# Patient Record
Sex: FEMALE
[De-identification: names, ages, dates, MRNs, and addresses within clinical notes are randomized per-mention and may not be internally consistent; named-entity substitution may affect disease eponyms.]

---

## 2018-07-20 ENCOUNTER — HOSPITAL ENCOUNTER (INPATIENT)
Dept: HOSPITAL 14 - H.L&D | Age: 28
LOS: 3 days | Discharge: HOME | DRG: 371 | End: 2018-07-23
Attending: OBSTETRICS & GYNECOLOGY | Admitting: OBSTETRICS & GYNECOLOGY
Payer: MEDICAID

## 2018-07-20 VITALS — BODY MASS INDEX: 34.8 KG/M2

## 2018-07-20 DIAGNOSIS — Z3A.39: ICD-10-CM

## 2018-07-20 DIAGNOSIS — N85.8: ICD-10-CM

## 2018-07-20 DIAGNOSIS — O34.211: Primary | ICD-10-CM

## 2018-07-20 LAB
BASOPHILS # BLD AUTO: 0 K/UL (ref 0–0.2)
BASOPHILS NFR BLD: 0.2 % (ref 0–2)
EOSINOPHIL # BLD AUTO: 0.1 K/UL (ref 0–0.7)
EOSINOPHIL NFR BLD: 1.2 % (ref 0–4)
ERYTHROCYTE [DISTWIDTH] IN BLOOD BY AUTOMATED COUNT: 14.4 % (ref 11.5–14.5)
HGB BLD-MCNC: 13 G/DL (ref 12–16)
LYMPHOCYTES # BLD AUTO: 2.4 K/UL (ref 1–4.3)
LYMPHOCYTES NFR BLD AUTO: 21 % (ref 20–40)
MCH RBC QN AUTO: 30 PG (ref 27–31)
MCHC RBC AUTO-ENTMCNC: 34.1 G/DL (ref 33–37)
MCV RBC AUTO: 87.9 FL (ref 81–99)
MONOCYTES # BLD: 0.7 K/UL (ref 0–0.8)
MONOCYTES NFR BLD: 6.2 % (ref 0–10)
NEUTROPHILS # BLD: 8.1 K/UL (ref 1.8–7)
NEUTROPHILS NFR BLD AUTO: 71.4 % (ref 50–75)
NRBC BLD AUTO-RTO: 0.1 % (ref 0–0)
PLATELET # BLD: 180 K/UL (ref 130–400)
PMV BLD AUTO: 10.3 FL (ref 7.2–11.7)
RBC # BLD AUTO: 4.34 MIL/UL (ref 3.8–5.2)
WBC # BLD AUTO: 11.4 K/UL (ref 4.8–10.8)

## 2018-07-20 PROCEDURE — 4A1HXCZ MONITORING OF PRODUCTS OF CONCEPTION, CARDIAC RATE, EXTERNAL APPROACH: ICD-10-PCS | Performed by: OBSTETRICS & GYNECOLOGY

## 2018-07-20 RX ADMIN — SIMETHICONE CHEW TAB 80 MG SCH MG: 80 TABLET ORAL at 22:52

## 2018-07-20 RX ADMIN — SIMETHICONE CHEW TAB 80 MG SCH: 80 TABLET ORAL at 16:38

## 2018-07-21 LAB
ERYTHROCYTE [DISTWIDTH] IN BLOOD BY AUTOMATED COUNT: 14.2 % (ref 11.5–14.5)
HGB BLD-MCNC: 11 G/DL (ref 12–16)
MCH RBC QN AUTO: 29.8 PG (ref 27–31)
MCHC RBC AUTO-ENTMCNC: 33.6 G/DL (ref 33–37)
MCV RBC AUTO: 88.7 FL (ref 81–99)
PLATELET # BLD: 145 K/UL (ref 130–400)
RBC # BLD AUTO: 3.68 MIL/UL (ref 3.8–5.2)
WBC # BLD AUTO: 13.8 K/UL (ref 4.8–10.8)

## 2018-07-21 RX ADMIN — OXYCODONE HYDROCHLORIDE AND ACETAMINOPHEN PRN TAB: 5; 325 TABLET ORAL at 08:18

## 2018-07-21 RX ADMIN — MULTIPLE VITAMINS W/ MINERALS TAB SCH TAB: TAB at 08:19

## 2018-07-21 RX ADMIN — SIMETHICONE CHEW TAB 80 MG SCH: 80 TABLET ORAL at 10:00

## 2018-07-21 RX ADMIN — SIMETHICONE CHEW TAB 80 MG SCH: 80 TABLET ORAL at 22:36

## 2018-07-21 RX ADMIN — SIMETHICONE CHEW TAB 80 MG SCH MG: 80 TABLET ORAL at 04:17

## 2018-07-21 RX ADMIN — SIMETHICONE CHEW TAB 80 MG SCH MG: 80 TABLET ORAL at 16:11

## 2018-07-22 RX ADMIN — OXYCODONE HYDROCHLORIDE AND ACETAMINOPHEN PRN TAB: 5; 325 TABLET ORAL at 15:01

## 2018-07-22 RX ADMIN — MULTIPLE VITAMINS W/ MINERALS TAB SCH TAB: TAB at 08:07

## 2018-07-22 RX ADMIN — SIMETHICONE CHEW TAB 80 MG SCH MG: 80 TABLET ORAL at 15:01

## 2018-07-22 RX ADMIN — SIMETHICONE CHEW TAB 80 MG SCH MG: 80 TABLET ORAL at 22:11

## 2018-07-22 RX ADMIN — SIMETHICONE CHEW TAB 80 MG SCH MG: 80 TABLET ORAL at 05:38

## 2018-07-22 RX ADMIN — SIMETHICONE CHEW TAB 80 MG SCH MG: 80 TABLET ORAL at 09:44

## 2018-07-22 NOTE — OBPPN
===========================

Datetime: 2018 07:00

===========================

   

PP Pain Prov:  Within normal limits

PP Nausea Prov:  Denies

PP Flatus Prov:  Yes

PP BM Prov:  Yes

PP Breasts Prov:  Not Done

PP Heart Prov:  Normal

PP Lungs Prov:  Normal

PP Abdomen/Uterus Prov:  Normal

PP Lochia Prov:  Normal

PP Vulva/Perineum Prov:  Not Done

PP CVA Tenderness Prov:  Not Done

PP Extremities Prov:  Normal

PP C/S Incision Prov:  Normal

PP Breastfeeding Progress Prov:  Normal

PP Impression Prov:  Normal postpartum progression

PP Plan Prov:  Continue present management

PP Progress Note Prov:   POD 2 

   S: 29 yo  s/p  on 2018. Pt. is seen and examined at bedside this AM. No over
night events. Pt reports mild abdominal pain, but well controlled with pain meds. D/c roger, dressing
 removed, incision site healing well, no exudate seen, dry and intact. No nausea, advised to advance 
diet as tolerated. Breast feeding without difficulty. Lochia is similar to menses volume. Bowel movem
ent and passing gas per rectum. Denies fever/chills, diarrhea, nausea/vomiting, chest pain, dyspnea, 
and dizziness. 

   O: 

   VS: stable 

   GEN: NAD 

   Cardio: S1S2, no murmurs 

   Lungs: clear breath sounds b/l, no wheezing 

   Abdomen: BS+, tenderness to palpation. Incision scar noted, well healing with no exudate seen, dry
 and intact. Uterus is firm and at the level of the umbilicus. 

   EXT: No edema, calves nontender 

   NEURO/PSYCH: AAOx3, no grossly focal deficits, preserved affect and mood. 

   Assessment/Plan: 29 yo  s/p  on 2018. Pt remains afebrile, tolerating pain w
ith medication, doing well on POD#2. OOB with caution 

   SCDs for DVT prophylaxis, encouraged ambulating 

   Percocet 5/325mg, and Motrin 600mg for pain. 

   Encourage breastfeeding and ambulating 

   f/u CBC post op, 11.0/32.7 

   Tdap given 

   Anticipated d/c to home, 2018. 

   Case dw OB attending 

   --- Ja Collier MD PGY-2 

      

   OB Hospitalist note: On rounds I saw and examiend this patient. Agree with note BRANNON HILL PP Procedures:  None

Vital Signs Provider PP:  Reviewed; Within Normal Limits

## 2018-07-23 VITALS
SYSTOLIC BLOOD PRESSURE: 133 MMHG | HEART RATE: 87 BPM | RESPIRATION RATE: 16 BRPM | DIASTOLIC BLOOD PRESSURE: 77 MMHG | TEMPERATURE: 97.7 F | OXYGEN SATURATION: 99 %

## 2018-07-23 RX ADMIN — SIMETHICONE CHEW TAB 80 MG SCH MG: 80 TABLET ORAL at 04:06

## 2018-07-23 RX ADMIN — MULTIPLE VITAMINS W/ MINERALS TAB SCH TAB: TAB at 09:10

## 2018-07-23 RX ADMIN — SIMETHICONE CHEW TAB 80 MG SCH MG: 80 TABLET ORAL at 09:10

## 2018-07-23 NOTE — OBDCSUM
===========================

Datetime: 2018 08:02

===========================

   

Discharged to, Provider:  Home

Follow up at, Provider:  [facility]

Disch Instr Activity:  May be up to bathroom; May be up for meals; May Shower

Disch Instr Diet:  Regular

Discharge Instructions, Provider:  Routine instructions given

Discharge Diagnosis, Provider:  Term Pregnancy Delivered

Follow up in weeks, Provider:  1week and 6 weeks

Disch Referrals:  None

Contraception discussed, Prov:  Yes

Disch Activity Restrictions:  No lifting; Minimize stair-climbing; Nothing in vagina - [place], sanchez vza

Discharge Comment, Provider:  1. Continue breastfeeding.

   2. Ibuprofen as needed for moderate pain, and percocet for severe pain. No driving or operating he
aleena machinery while on percocet because of drowsiness.

   3. Continue walking as much as tolerated.

   4. Please plan  follow up visit in 3-7 days with pediatrician.

   5. Please see your doctor in 6 weeks.

   6. No heavy lifting or anything in the vagina for 6 weeks. 

   7. Please avoid stairs if possible. 

   8. If you develop severe or worsening pain, please go to the ED.

      

Contraception after Delivery:  Birth Control Pill/Patch

## 2018-07-23 NOTE — OBPPN
===========================

Datetime: 2018 08:12

===========================

   

PP Pain Prov:  Within normal limits

PP Nausea Prov:  Denies

PP Flatus Prov:  No

PP BM Prov:  No

PP Heart Prov:  Normal

PP Lungs Prov:  Normal

PP Abdomen/Uterus Prov:  Normal

PP Lochia Prov:  Normal

PP Extremities Prov:  Normal

PP C/S Incision Prov:  Normal

PP Breastfeeding Progress Prov:  Normal

PP Impression Prov:  Normal postpartum progression

PP Plan Prov:  Continue present management

PP Progress Note Prov:  S: Pt is a 29 yo  39wks s/p C section on 18 POD 3. Seen and exami
yeyo at bedside this am.  Patient denies any significant overnight events. Reports mild pelvic crampin
g which is controlled with pain medicine. OOB/Ambulation well without dizziness. Breast/Bottle feedin
g without difficulty. Tolerating regular diet. Lochia is similar to menses. Voiding freely with no bl
ood noted, denies having a bowel movement, not passing gas per rectum. Denies fever/chills, diarrhea,
 nausea/vomiting, CP/SOB , Lightheadedness, Calf pain.    

      

   O: BP:134/74, HR:85, T:98.9F, RR: 20 

   CBC-11.0/32.7, blood type: O+, rubella: Immune 

      

   PHYSICAL EXAM:   

   GEN: AAOx3, Resting comfortably in bed, NAD   

   HEENT: NCAT, White sclera, pink conjunctiva, oral mucosa moist.    

   LUNGS: CTA B/L, no wheezing, rhonchi, or rales, B/L chest rise  

   CVS: RRR, S1, S2, No murmurs, rubs, gallops   

   ABD: ND, +BS, firm fundus @ umbilical level. Soft, appropriate TTP, Incision- clean, dry, intact 

   EXT: no edema, negative Leo's sign, calves nontender   

   NEURO/Psych: no gross focal deficit, preserved affect and mood.     

      

   A/P  

   29 y/o  39 wks post op, had a C section on 18 @ 12:04am Pt afebrile, tolerating pain w
ith medication, tolerating regular diet, adequate urine output.  

      

   -Encourage breastfeeding and ambulation   

   -Ibuprofen 600mg mild pain 

   -Percocet 5/325mg 1 tab Q 6hours prn moderate to severe pain  

   -PNV 1 tab po daily 

   -Colace and Zakblhm30.2mg PO HS for constipation 

   -Simethicone for flatulence  

   -Post op contraception-OCP's 

   -F/U 1 wk for wound check,  4-6 weeks for post-partum visit.  

      

   [place] M.D. PGY-1  

   Patient was seen and examined with Dr. Brothers   

      

   Patient seen and examined by me this am. Agree with above note. Patient for discharge home today a
nd to rtc in 1 wk for incision check. 

Vital Signs Provider PP:  Reviewed

## 2018-07-23 NOTE — OBADHP
===========================

Datetime: 2018 08:23

===========================

   

Admit Comment, IP Provider:  PNP: Dr. Olson

   27 yo  at 39 wks based on LMP: 10/12/2018 is presenting for scheduled repeat . Fetal 
movement appreciated w/i past 5 minutes. Patient denied any vaginal bleeding, contraction or loss of 
fluid. Patient has no complaints.

   OBGYNhx: , , M, fetus swallowed meconium

   PMH: none

   Famhx: Father-HTN 

   Sochx: no Tobacco, EtOH or drugs

   Allergies: none

   Meds: prenatals

   ROS: denied headache, cp, sob, n/v/d

      

   Vitals: BP-107/74 Spo2-100% Pulse 87

   PE: 

   Gen: well appearing female in no acute distress

   Cardio: s1s2 no murmurs

   Resp: clear b/l

   Abd: BS+

   FHR: 140 baseline, reg with moderate variability

   TOCO: occasional

   Ext: calves nontender

      

   A/P: 27 yo  at 39 wks  is presenting for scheduled repeat . 

   1. Scheduled repeat : admit to unit,  protocol initiated.

      

   Case discussed with attending

   Anita Simmons PGY-1

      

   Patient seen and examined by me. Agree with above resident note.

   -Dr. Olson

Abdomen - PN:  Normal

Lungs - PN:  Normal

Heart - PN:  Normal

General - PN:  Normal

FHR - Baseline A Provider:  140

Gestation - Est Wks by US:  39.0

IP Prenatal Hx Assessment:  The Prenatal History has been Reviewed and is Current

Vital Signs Provider:  Reviewed; Within Normal Limits

IP Chief Complaint:  Scheduled  Section

NICHD Accel Fetus A IP Provider:  [id number]

NICHD Decel Fetus A IP Provider:  None

IP Adm Impression:  Term, intrauterine pregnancy

IP Admit Plan:  Admit to unit; Initiate  Section protocol

## 2018-07-23 NOTE — OBDS
===================================

DELIVERY PERSONNEL

===================================

   

Delivery Doctor:  VALENTINA Olson MD

Scrub Nurse:  Greta Orr OBT

Circulator:  CASS Burden RN/ ALEKS Sandoval RN

Anesthesiologist:  Dr Monson

Resident:  Dr Knott

   

===================================

MATERNAL INFORMATION

===================================

   

Delivery Anesthesia:  Spinal

Medications in Delivery:  Pitocin

Estimated  Blood Loss (ml):  800

Placenta Cultured:  No

Maternal Complications:  None

Provider Comments:  See Operative Report

   

===================================

LABOR SUMMARY

===================================

   

EDC:  2018 00:00

No. Babies in Womb:  1

 Attempted:  No

Labor Anesthesia:  None

   

===================================

LABOR INFORMATION

===================================

   

Reason for Induction:  Not Applicable

Reason for Induction Other:  N/A

Other Ripening Agents:  N/A

Oxytocin:  N/A

Group B Beta Strep:  Negative (Annotations: 18)

Antibiotics # of Doses:  0

Antibiotics Time of Last Dose:  N/A

Steroids Given:  None

Reason Steroids Not Administered:  Not Applicable

Other Reason Not Administered:  N/A

   

===================================

MEMBRANES

===================================

   

Membranes Rupture Method:  Artificial

Rupture of Membranes:  2018 12:03

Length of Rupture (hrs):  0.02

Amniotic Fluid Color:  Light Meconium

Amniotic Fluid Amount:  Small

Amniotic Fluid Odor:  None

   

===================================

STAGES OF LABOR

===================================

   

Stage 3 hrs:  0

Stage 3 min:  1

   

===================================

CSECTION DELIVERY

===================================

   

Primary Indication:  Repeat Elective

CSection Urgency:  Elective

CSection Incidence:  Primary

Labor:  No Labor

Elective:  Elective

CSection Incision:  Lower Uterine Transverse

   

===================================

BABY A INFORMATION

===================================

   

Infant Delivery Date/Time:  2018 12:04

Method of Delivery:  

Born in Route :  No

:  N/A

Forceps:  N/A

Vacuum Extraction:  Successful

Shoulder Dystocia :  No

   

===================================

ASSISTED DELIVERY BABY A

===================================

   

Indication for Assisted Delivery:  Failed attempt at manual delivery

Catheter Prior to Procedure:  No

Vacuum Number of Pulls:  1

Vacuum Number of PopOffs:  0

Vacuum :  Kiwi

Total Time Vacuum Applied:  10 seconds

Type of Forceps:  N/A

   

===================================

SHOULDER DYSTOCIA BABY A

===================================

   

Infant Delivery Date/Time:  2018 12:04

   

===================================

PRESENTATION/POSITION BABY A

===================================

   

Presentation:  Cephalic

Cephalic Presentation:  Vertex

Breech Presentation:  N/A

   

===================================

PLACENTA INFORMATION BABY A

===================================

   

Placenta Delivery Time :  2018 12:05

Placenta Method of Delivery:  Manual Removal

Placenta Status:  Delivered

   

===================================

APGAR SCORES BABY A

===================================

   

Heart Rate 1 min:  >100 bpm

Resp Effort 1 min:  Good Cry

Reflex Irritability 1 min:  Cough or Sneeze or Pulls Away

Muscle Tone 1 min:  Active Motion

Color 1 min:  Body Pink, Extremities Blue

Resuscitation Effort 1 min:  Tactile Stimulation

APGAR SCORE 1 MIN:  9

Heart Rate 5 min:  >100 bpm

Resp Effort 5 min:  Good Cry

Reflex Irritability 5 min:  Cough or Sneeze or Pulls Away

Muscle Tone 5 min:  Active Motion

Color 5 min:  Body Pink, Extremities Blue

Resuscitation Effort 5 min:  N/A

APGAR SCORE 5 MIN:  9

   

===================================

INFANT INFORMATION BABY A

===================================

   

Gestational Age at Delivery:  39.0

Gestational Status:  Term

Infant Outcome :  Liveborn

Infant Condition :  Stable

Infant Sex:  Male

   

===================================

IDENTIFICATION/MEDS BABY A

===================================

   

ID Band Number:  [id number]

ID Band Location:  Left Leg; Left Arm



Vitamin K Given :  Not Given

Erythromycin Given:  Not Given

   

===================================

WEIGHT/LENGTH BABY A

===================================

   

Infant Birthweight (gms):  3360

Infant Weight (lb):  7

Infant Weight (oz):  6

Infant Length Inches:  19.50

Infant Length cms:  49.5

   

===================================

CORD INFORMATION BABY A

===================================

   

No. Cord Vessels:  3

Nuchal Cord :  N/A

Nuchal Cord Other:  0

True Knot:  0

Infant Cord pH Baby Arterial:  N/A

Infant Cord pH Baby Venous:  N/A

Cord Blood Taken:  Yes

Banking/Donate Info:  N/A

Infant Suction:  Mouth; Nose

   

===================================

ASSESSMENT BABY A

===================================

   

Infant Complications:  None

Physical Findings at Delivery:  Within Normal Limits

Infant Respirations:  Appears Normal

Neonatologist/ALS Called :  No

Infant Care By:  Dr Gibson/ S Mariana RN

Transferred To:  Remains with Mother

## 2018-07-23 NOTE — OP
PROCEDURE DATE:  2018



PREOPERATIVE DIAGNOSIS:  Term pregnancy with previous  section,

declined vaginal birth after a caesarean section.



POSTOPERATIVE DIAGNOSIS:  Term pregnancy with previous  section,

declined vaginal birth after a caesarean section, delivered.



PROCEDURE DONE:  Repeat low-transverse  section.



SURGEON:  Jeffy Ribeiro MD



ASSISTANT:  Dr. Sami Turcios and  _____  PGY2.



ESTIMATED BLOOD LOSS:  800 mL.



INTRAVENOUS FLUID:  1300 mL of lactated Ringer's.



URINE OUTPUT:  250 mL clear at the end of procedure.



COMPLICATIONS:  None.



CLOSURE:  Subcuticular.



FINDINGS:  A live male with Apgars of 9 and 9, weight 3360 gm, delivered in

vertex presentation, light meconium fluid.



PATHOLOGY:  None.



DESCRIPTION OF PROCEDURE:  The patient was taken to the operating room and

given spinal anesthesia without difficulty.  She was then prepped and

draped in a normal sterile fashion in dorsal supine position with a

leftward tilt.  A Pfannenstiel skin incision was then made with a scalpel

after removing the previous keloid scar.  This was carried to the

underlying fascia with the Bovie.  The fascia was then incised in the

midline and the incision was extended laterally using the Bovie.  The

inferior aspect of fascial incision was gasped with Kocher clamps,

elevated, and the underlying rectus muscles were dissected off sharply with

Bovie, then bluntly.  Attention was then turned to the superior aspect of

the fascial incision, which in a similar fashion was dissected off with the

Bovie, then bluntly.  The rectus muscles were then meticulously 

in the midline.  The peritoneum was identified, tented up, and entered with

Metzenbaum scissors.  This incision was then extended superiorly,

laterally, and inferiorly paying close attention to the bladder.  The

bladder blade was inserted.  The vesicouterine peritoneum was identified,

tented up, and entered up with Metzenbaum scissors.  This was extended

laterally and the bladder flap was created digitally.  The bladder blade

was reinserted and the lower uterine segment was identified, and entered in

a transverse fashion with a scalpel.  This incision was then extended

cephalocaudally  bluntly and attempts was made to deliver the baby but due

to this failed attempt, a vacuum was then applied and was used for

assistance for delivery of the baby with no complication.  The baby was

delivered atraumatically.  The nose and mouth were suction on the abdomen. 

The cord was doubly clamped and cut, and the infant was handed off to the

waiting pediatrician.  Cord blood was then taken.  The placenta was

exteriorized manually.  The uterus was then exteriorized and cleared of all

clots and debris.  The uterine incision was then closed with one Vicryl in

a running locked fashion and a second layer was then placed, and good

hemostasis was noted.  The uterus was then returned to the abdomen.  The

gutters were cleared of all clots and debris.  Inspection of the uterine

incision again revealed good hemostasis.  The peritoneum was then cleaned

_____ with 2-0 Vicryl in a running fashion.  The fascia was then closed

with 0 Vicryl in a running fashion to the midline.  The Bovie was used to

obtain good hemostasis on the subcutaneous fat.  This layer was also closed

with a 4 interrupted sutures of 3-0 plain suture.  The skin was then closed

with 4-0 Monocryl on the Francisco needle for subcuticular stitch, and this

incision was then covered with Steri-Strips and a sterile dressing.  The

patient tolerated the procedure well.  Sponge, lap, and needle counts were

correct x4.  Ancef 2 gm was given preoperatively.  The patient was taken to

the recovery room in stable condition.  There was no injury to the bladder,

bowel, ureter, or baby.  Due to the nature of this case, an assistance was

requested.    My assistant, Dr. Sami Turcios was present for the entire

procedure from the initial incision to the patient's transfer to the

recovery room.   He assisted by providing good retraction to minimize blood

loss and ensure a good hemostasis.  He also assisted for entry into the

abdomen, closure of the uterus,  and closure of all layers of the abdominal

wall.  The procedure could not be performed without his assistance.





__________________________________________

Jeffy Ribeiro MD





DD:  2018 8:42:02

DT:  2018 16:59:48

Job # [id number]